# Patient Record
Sex: FEMALE | Race: BLACK OR AFRICAN AMERICAN | NOT HISPANIC OR LATINO | Employment: FULL TIME | URBAN - METROPOLITAN AREA
[De-identification: names, ages, dates, MRNs, and addresses within clinical notes are randomized per-mention and may not be internally consistent; named-entity substitution may affect disease eponyms.]

---

## 2022-10-17 ENCOUNTER — TELEPHONE (OUTPATIENT)
Dept: FAMILY MEDICINE | Facility: CLINIC | Age: 27
End: 2022-10-17
Payer: MEDICAID

## 2022-10-17 NOTE — TELEPHONE ENCOUNTER
----- Message from Williams Robles sent at 10/17/2022  8:38 AM CDT -----  Type:  Sooner Appointment Request    Caller is requesting a sooner appointment.  Caller declined first available appointment listed below.  Caller will not accept being placed on the waitlist and is requesting a message be sent to doctor.    Name of Caller:  pt  When is the first available appointment?  11/3  Symptoms:  est care/chest pains/anxiety  Best Call Back Number:  [phone] (home)     Additional Information:  pt would like to know if she can be seen Friday 10/21 she will be off that day or if she can be seen 11/9--please call and advise--thank you

## 2024-01-29 PROCEDURE — 12013 RPR F/E/E/N/L/M 2.6-5.0 CM: CPT

## 2024-01-29 PROCEDURE — 99285 EMERGENCY DEPT VISIT HI MDM: CPT | Mod: 25

## 2024-01-30 ENCOUNTER — HOSPITAL ENCOUNTER (EMERGENCY)
Facility: HOSPITAL | Age: 29
Discharge: HOME | End: 2024-01-30
Attending: EMERGENCY MEDICINE
Payer: COMMERCIAL

## 2024-01-30 VITALS
HEART RATE: 75 BPM | WEIGHT: 140 LBS | TEMPERATURE: 98 F | SYSTOLIC BLOOD PRESSURE: 133 MMHG | RESPIRATION RATE: 18 BRPM | OXYGEN SATURATION: 100 % | DIASTOLIC BLOOD PRESSURE: 74 MMHG | BODY MASS INDEX: 24.8 KG/M2 | HEIGHT: 63 IN

## 2024-01-30 DIAGNOSIS — S01.112A LACERATION OF LEFT EYEBROW, INITIAL ENCOUNTER: Primary | ICD-10-CM

## 2024-01-30 PROCEDURE — 90471 IMMUNIZATION ADMIN: CPT | Performed by: EMERGENCY MEDICINE

## 2024-01-30 PROCEDURE — 25000003 PHARM REV CODE 250: Performed by: EMERGENCY MEDICINE

## 2024-01-30 PROCEDURE — 90715 TDAP VACCINE 7 YRS/> IM: CPT | Performed by: EMERGENCY MEDICINE

## 2024-01-30 PROCEDURE — 63600175 PHARM REV CODE 636 W HCPCS: Performed by: EMERGENCY MEDICINE

## 2024-01-30 RX ORDER — HYDROCODONE BITARTRATE AND ACETAMINOPHEN 10; 325 MG/1; MG/1
1 TABLET ORAL
Status: COMPLETED | OUTPATIENT
Start: 2024-01-30 | End: 2024-01-30

## 2024-01-30 RX ORDER — HYDROCODONE BITARTRATE AND ACETAMINOPHEN 5; 325 MG/1; MG/1
1 TABLET ORAL EVERY 6 HOURS PRN
Qty: 6 TABLET | Refills: 0 | Status: SHIPPED | OUTPATIENT
Start: 2024-01-30

## 2024-01-30 RX ADMIN — HYDROCODONE BITARTRATE AND ACETAMINOPHEN 1 TABLET: 10; 325 TABLET ORAL at 12:01

## 2024-01-30 RX ADMIN — TETANUS TOXOID, REDUCED DIPHTHERIA TOXOID AND ACELLULAR PERTUSSIS VACCINE, ADSORBED 0.5 ML: 5; 2.5; 8; 8; 2.5 SUSPENSION INTRAMUSCULAR at 02:01

## 2024-01-30 NOTE — ED PROVIDER NOTES
Encounter Date: 1/29/2024       History     Chief Complaint   Patient presents with    Facial Laceration     Hit left eye brow on wooden furniture. 2.5cm laceration, bleeding controlled.        29-year-old female presents with laceration to the left eye after tripping over a dog bed and striking her head on a piece of furniture.  She has laceration to left eyebrow.  No loss of consciousness no confusion no vomiting.    The history is provided by the patient.     Review of patient's allergies indicates:  No Known Allergies  Past Medical History:   Diagnosis Date    Allergy     Asthma     RAD     History reviewed. No pertinent surgical history.  Family History   Problem Relation Age of Onset    Hypertension Mother     Diabetes Maternal Grandmother     Hypertension Paternal Grandfather      Social History     Tobacco Use    Smoking status: Never   Substance Use Topics    Alcohol use: No    Drug use: No     Review of Systems   Gastrointestinal:  Negative for vomiting.   Skin:  Positive for wound.   Neurological:  Positive for headaches.   Psychiatric/Behavioral:  Negative for confusion.        Physical Exam     Initial Vitals [01/29/24 2358]   BP Pulse Resp Temp SpO2   (!) 161/90 93 19 98 °F (36.7 °C) 100 %      MAP       --         Physical Exam    Nursing note and vitals reviewed.  Constitutional: She appears well-developed and well-nourished.   HENT:   Head: Normocephalic. Head is with laceration.       Eyebrow laceration horizontal in the left eyebrow is about 3 cm    There is some periorbital bruising    Left zygomatic arch is tender but no deformity   Eyes: EOM are normal.   Neck: Neck supple.   Normal range of motion.   Full passive range of motion without pain.     Pulmonary/Chest: No respiratory distress.   Musculoskeletal:         General: Normal range of motion.      Cervical back: Full passive range of motion without pain, normal range of motion and neck supple. No rigidity. No spinous process tenderness or  muscular tenderness. Normal range of motion.     Neurological: She is alert and oriented to person, place, and time.   Skin: Skin is warm and dry.   Psychiatric: She has a normal mood and affect. Her behavior is normal. Judgment and thought content normal.         ED Course   Lac Repair    Date/Time: 1/29/2024 11:49 PM    Performed by: Enmanuel Elaine MD  Authorized by: Enmanuel Elaine MD    Consent:     Consent obtained:  Verbal    Consent given by:  Patient    Risks, benefits, and alternatives were discussed: yes      Risks discussed:  Infection and pain  Universal protocol:     Procedure explained and questions answered to patient or proxy's satisfaction: yes      Patient identity confirmed:  Verbally with patient  Anesthesia:     Anesthesia method:  Local infiltration    Local anesthetic:  Lidocaine 1% w/o epi  Laceration details:     Location:  Face    Face location:  L eyebrow    Length (cm):  3  Pre-procedure details:     Preparation:  Patient was prepped and draped in usual sterile fashion  Exploration:     Limited defect created (wound extended): no      Hemostasis achieved with:  Direct pressure    Wound exploration: wound explored through full range of motion and entire depth of wound visualized      Wound extent: areolar tissue violated      Wound extent: no foreign bodies/material noted      Contaminated: no    Treatment:     Amount of cleaning:  Standard    Irrigation solution:  Sterile saline    Irrigation volume:  50ml    Irrigation method:  Syringe    Visualized foreign bodies/material removed: no      Debridement:  None    Undermining:  None    Scar revision: no    Skin repair:     Repair method:  Sutures    Suture size:  5-0    Suture material:  Prolene    Suture technique:  Simple interrupted    Number of sutures:  5  Approximation:     Approximation:  Close  Repair type:     Repair type:  Simple  Post-procedure details:     Dressing:  Open (no dressing)    Labs Reviewed - No data to  display       Imaging Results              CT Maxillofacial Without Contrast (In process)                      CT Head Without Contrast (In process)                      Medications   HYDROcodone-acetaminophen  mg per tablet 1 tablet (1 tablet Oral Given 1/30/24 0025)   Tdap (BOOSTRIX) vaccine injection 0.5 mL (0.5 mLs Intramuscular Given 1/30/24 0228)     Medical Decision Making  29-year-old presents to the ER with a left eyebrow laceration that was easily repaired in the emergency room.  Tetanus was updated.  CT of the head and face are unremarkable for any significant acute finding.  Suture removal in a week.  No indication for antibiotics.  She has no other complaints.  No neck pain no neck tenderness.    Amount and/or Complexity of Data Reviewed  Radiology: ordered.    Risk  Prescription drug management.               ED Course as of 01/30/24 0243 Tue Jan 30, 2024   0000 Temp: 98 °F (36.7 °C) [EF]   0000 Temp Source: Oral [EF]   0000 Pulse: 93 [EF]   0000 Resp: 19 [EF]   0000 SpO2: 100 % [EF]   0200 IMPRESSION: No acute intracranial hemorrhage. Dictated and Authenticated by: Cole Florentino MD 01/30/2024 1:57 AM Central Time ([place] & [place]   [EF]       0205 IMPRESSION: Left supraorbital soft tissue soft tissue stranding/laceration. Dictated and Authenticated by: Cole Florentino MD 01/30/2024 2:02 AM Central Time ([place] & [place]   [EF]      ED Course User Index  [EF] Enmanuel Elaine MD                           Clinical Impression:  Final diagnoses:  [S01.112A] Laceration of left eyebrow, initial encounter (Primary)          ED Disposition Condition    Discharge Stable          ED Prescriptions       Medication Sig Dispense Start Date End Date Auth. Provider    HYDROcodone-acetaminophen (NORCO) 5-325 mg per tablet Take 1 tablet by mouth every 6 (six) hours as needed for Pain. 6 tablet 1/30/2024 -- Enmanuel Elaine MD          Follow-up Information       Follow up With Specialties Details Why Contact Info  Additional Information    [facility] Emergency Medicine In 1 week For suture removal [address] [place] [address] [address] [place] [address] 1st floor             Chele, Enmanuel CUMMINGS MD  01/30/24 6920

## 2025-01-06 ENCOUNTER — OFFICE VISIT (OUTPATIENT)
Dept: DERMATOLOGY | Facility: CLINIC | Age: 30
End: 2025-01-06
Payer: COMMERCIAL

## 2025-01-06 DIAGNOSIS — L71.0 PERIORIFICIAL DERMATITIS: Primary | ICD-10-CM

## 2025-01-06 PROCEDURE — 99203 OFFICE O/P NEW LOW 30 MIN: CPT | Mod: S$GLB,,, | Performed by: DERMATOLOGY

## 2025-01-06 PROCEDURE — 99999 PR PBB SHADOW E&M-EST. PATIENT-LVL III: CPT | Mod: PBBFAC,,, | Performed by: DERMATOLOGY

## 2025-01-06 RX ORDER — DOXYCYCLINE HYCLATE 100 MG
TABLET ORAL
Qty: 30 TABLET | Refills: 1 | Status: SHIPPED | OUTPATIENT
Start: 2025-01-06
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
## 2025-01-10 NOTE — PATIENT INSTRUCTIONS

## 2025-04-01 DIAGNOSIS — L71.0 PERIORIFICIAL DERMATITIS: ICD-10-CM

## 2025-04-01 NOTE — TELEPHONE ENCOUNTER
Last seen 01/06/2025      Periorificial dermatitis  Avoid cinnamon. May continue topical tacrolimus and will add doxy.  -     doxycycline (VIBRA-TABS) 100 MG tablet; Take 1 tab po daily with food and large glass of water. Remain upright x 1 hr after taking.  Dispense: 30 tablet; Refill: 1  Discussed benefits and risks of doxycyline therapy including but not limited to GI discomfort, esophageal irritation/ulceration, and increased sun sensitivity. Patient was counseled to take medicine with meals and at least 1 hour before lying down.      RTC prn flares

## 2025-04-02 RX ORDER — DOXYCYCLINE HYCLATE 100 MG
TABLET ORAL
Qty: 30 TABLET | Refills: 1 | Status: SHIPPED | OUTPATIENT
Start: 2025-04-02